# Patient Record
Sex: MALE | Race: WHITE | URBAN - METROPOLITAN AREA
[De-identification: names, ages, dates, MRNs, and addresses within clinical notes are randomized per-mention and may not be internally consistent; named-entity substitution may affect disease eponyms.]

---

## 2018-04-17 ENCOUNTER — EMERGENCY (EMERGENCY)
Facility: HOSPITAL | Age: 38
LOS: 1 days | End: 2018-04-17
Payer: COMMERCIAL

## 2018-04-17 PROCEDURE — 99283 EMERGENCY DEPT VISIT LOW MDM: CPT

## 2025-06-30 ENCOUNTER — EMERGENCY (EMERGENCY)
Facility: HOSPITAL | Age: 45
LOS: 1 days | End: 2025-06-30
Attending: EMERGENCY MEDICINE | Admitting: EMERGENCY MEDICINE
Payer: COMMERCIAL

## 2025-06-30 VITALS
SYSTOLIC BLOOD PRESSURE: 155 MMHG | HEART RATE: 87 BPM | RESPIRATION RATE: 14 BRPM | TEMPERATURE: 99 F | OXYGEN SATURATION: 99 % | DIASTOLIC BLOOD PRESSURE: 85 MMHG

## 2025-06-30 VITALS
TEMPERATURE: 99 F | RESPIRATION RATE: 16 BRPM | WEIGHT: 199.96 LBS | DIASTOLIC BLOOD PRESSURE: 96 MMHG | HEIGHT: 72 IN | SYSTOLIC BLOOD PRESSURE: 158 MMHG | HEART RATE: 85 BPM | OXYGEN SATURATION: 99 %

## 2025-06-30 PROCEDURE — 99283 EMERGENCY DEPT VISIT LOW MDM: CPT

## 2025-06-30 PROCEDURE — 99284 EMERGENCY DEPT VISIT MOD MDM: CPT

## 2025-06-30 RX ORDER — IBUPROFEN 200 MG
600 TABLET ORAL ONCE
Refills: 0 | Status: COMPLETED | OUTPATIENT
Start: 2025-06-30 | End: 2025-06-30

## 2025-06-30 RX ADMIN — Medication 600 MILLIGRAM(S): at 12:53

## 2025-06-30 RX ADMIN — Medication 600 MILLIGRAM(S): at 12:52

## 2025-06-30 NOTE — ED PROVIDER NOTE - PROGRESS NOTE DETAILS
patient stable.  Motrin given for slight headache.  No signs of facial trauma.  Do not suspect intracranial major skull fracture.  Offered CT due to slight headache and numbness which patient declines due to low suspicion for intracranial hemorrhage or skull fracture.  General soft tissue instructions discussed.  Recommend follow-up with orthospine if symptoms continue or fail to improve.  Referral provided if needed

## 2025-06-30 NOTE — ED PROVIDER NOTE - NEUROLOGICAL, MLM
Alert and oriented, no focal deficits, no motor or sensory deficits on exam.  Symmetric eyebrow raise and smile.  Ambulatory with steady gait

## 2025-06-30 NOTE — ED PROVIDER NOTE - CARE PROVIDER_API CALL
Dinesh Muir  Orthopaedic Surgery  [address] [place] [id number]  Phone: [phone]  Fax: [phone]  Follow Up Time: 1-3 Days

## 2025-06-30 NOTE — ED PROVIDER NOTE - PATIENT PORTAL LINK FT
You can access the FollowMyHealth Patient Portal offered by [facility] by registering at the following website: [email or web address]. By joining [facility]’s FollowMyHealth portal, you will also be able to view your health information using other applications (apps) compatible with our system.

## 2025-06-30 NOTE — ED PROVIDER NOTE - NEURO NEGATIVE STATEMENT, MLM
no loss of consciousness, no gait abnormality, slight headache, slight tingling to right side of face and neck - nearly resolved

## 2025-06-30 NOTE — ED PROVIDER NOTE - MUSCULOSKELETAL, MLM
Spine appears normal, range of motion is not limited, no vert tenderness.  no muscle or joint tenderness. full ROM of all ext

## 2025-06-30 NOTE — ED PROVIDER NOTE - DIFFERENTIAL DIAGNOSIS
differentials include but limited to sprain, strain.  No vertebral tenderness to suggest vertebral fracture.  No evidence of head or facial trauma on exam.  No neurodeficits.  Low suspicion for intracranial hemorrhage or skull fracture.  No right upper quadrant or left upper quadrant tenderness to suggest injury to liver or spleen Differential Diagnosis
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
